# Patient Record
Sex: FEMALE | Race: OTHER | NOT HISPANIC OR LATINO | Employment: FULL TIME | URBAN - METROPOLITAN AREA
[De-identification: names, ages, dates, MRNs, and addresses within clinical notes are randomized per-mention and may not be internally consistent; named-entity substitution may affect disease eponyms.]

---

## 2023-10-26 ENCOUNTER — OFFICE VISIT (OUTPATIENT)
Dept: PRIMARY CARE | Facility: CLINIC | Age: 38
End: 2023-10-26
Payer: COMMERCIAL

## 2023-10-26 VITALS
HEIGHT: 66 IN | HEART RATE: 77 BPM | TEMPERATURE: 98 F | BODY MASS INDEX: 19.44 KG/M2 | DIASTOLIC BLOOD PRESSURE: 68 MMHG | SYSTOLIC BLOOD PRESSURE: 122 MMHG | WEIGHT: 121 LBS

## 2023-10-26 DIAGNOSIS — R79.89 ABNORMAL LFTS: ICD-10-CM

## 2023-10-26 DIAGNOSIS — J01.00 ACUTE NON-RECURRENT MAXILLARY SINUSITIS: Primary | ICD-10-CM

## 2023-10-26 DIAGNOSIS — R44.8 FACIAL PRESSURE: ICD-10-CM

## 2023-10-26 PROCEDURE — 99213 OFFICE O/P EST LOW 20 MIN: CPT | Performed by: INTERNAL MEDICINE

## 2023-10-26 PROCEDURE — 1036F TOBACCO NON-USER: CPT | Performed by: INTERNAL MEDICINE

## 2023-10-26 RX ORDER — AMOXICILLIN AND CLAVULANATE POTASSIUM 875; 125 MG/1; MG/1
875 TABLET, FILM COATED ORAL 2 TIMES DAILY
Qty: 20 TABLET | Refills: 0 | Status: SHIPPED | OUTPATIENT
Start: 2023-10-26 | End: 2023-11-05

## 2023-10-26 ASSESSMENT — ENCOUNTER SYMPTOMS
LOSS OF SENSATION IN FEET: 0
OCCASIONAL FEELINGS OF UNSTEADINESS: 0
DEPRESSION: 0

## 2023-10-26 ASSESSMENT — LIFESTYLE VARIABLES
HOW MANY STANDARD DRINKS CONTAINING ALCOHOL DO YOU HAVE ON A TYPICAL DAY: PATIENT DOES NOT DRINK
HOW OFTEN DO YOU HAVE SIX OR MORE DRINKS ON ONE OCCASION: NEVER
HOW OFTEN DO YOU HAVE A DRINK CONTAINING ALCOHOL: NEVER
SKIP TO QUESTIONS 9-10: 1
AUDIT-C TOTAL SCORE: 0

## 2023-10-26 ASSESSMENT — PATIENT HEALTH QUESTIONNAIRE - PHQ9
SUM OF ALL RESPONSES TO PHQ9 QUESTIONS 1 & 2: 0
2. FEELING DOWN, DEPRESSED OR HOPELESS: NOT AT ALL
1. LITTLE INTEREST OR PLEASURE IN DOING THINGS: NOT AT ALL

## 2023-10-26 NOTE — PROGRESS NOTES
Patient Is seen in the office today with chief complaint of cough congestion nasal drainage and facial pressure for more than 10 days.  She has no fever or chills.  Denies any shortness of breath or wheezing.  Has no headache or visual disturbances.  Denies any nausea vomiting abdominal pain.  She has no weakness of extremities.  Review of other systems are negative.    On examination:  General examination: There is no acute discomfort  Eyes: There is no pallor jaundice  Ears: Both external auditory canal and tympanic membrane's are normal  Nose: Nasal mucosa is congested  Oral cavity throat: Pharyngeal wall is congested: Postnasal discharge is present  Neck: There is no lymphadenopathy or thyroid enlargement  Lungs: Clear to auscultation  CVS: Heart sounds are regular there is no gallop murmur  Abdomen: Normal exam    Assessment and plan:  1. Acute sinusitis: I do recommend checking for influenza and COVID-19 infection however patient is requesting an antibiotic prescription because symptoms are lingering for more than 10 days.  Augmentin is being prescribed  2.  Cough: Advised to take cough medications over-the-counter if needed  3.  History of abnormal liver function test: Patient was advised to have some blood test done after the last office visit however patient has not done.  She will make an appointment for annual wellness and discuss further about this issue in the office visit.

## 2024-05-22 PROBLEM — M54.9 BACK PAIN: Status: ACTIVE | Noted: 2024-05-22

## 2024-05-22 PROBLEM — D24.9 BREAST FIBROADENOMA: Status: ACTIVE | Noted: 2024-05-22

## 2024-05-22 PROBLEM — N39.0 UTI (URINARY TRACT INFECTION): Status: ACTIVE | Noted: 2024-05-22

## 2024-05-22 PROBLEM — R79.89 ABNORMAL LFTS (LIVER FUNCTION TESTS): Status: ACTIVE | Noted: 2024-05-22

## 2024-05-22 PROBLEM — N91.0 DELAYED MENSTRUATION: Status: ACTIVE | Noted: 2024-05-22

## 2024-05-22 PROBLEM — M79.10 MYALGIA: Status: ACTIVE | Noted: 2024-05-22

## 2024-05-22 PROBLEM — R22.2 ABDOMINAL WALL LUMP: Status: ACTIVE | Noted: 2024-05-22

## 2024-05-22 PROBLEM — R79.89 ELEVATED SERUM HCG: Status: ACTIVE | Noted: 2024-05-22

## 2024-05-22 PROBLEM — R53.83 FATIGUE: Status: ACTIVE | Noted: 2024-05-22

## 2024-05-23 ENCOUNTER — OFFICE VISIT (OUTPATIENT)
Dept: PRIMARY CARE | Facility: CLINIC | Age: 39
End: 2024-05-23
Payer: COMMERCIAL

## 2024-05-23 VITALS
HEART RATE: 91 BPM | WEIGHT: 123 LBS | SYSTOLIC BLOOD PRESSURE: 122 MMHG | OXYGEN SATURATION: 100 % | DIASTOLIC BLOOD PRESSURE: 78 MMHG | HEIGHT: 66 IN | BODY MASS INDEX: 19.77 KG/M2

## 2024-05-23 DIAGNOSIS — M25.511 CHRONIC RIGHT SHOULDER PAIN: Primary | ICD-10-CM

## 2024-05-23 DIAGNOSIS — G89.29 CHRONIC RIGHT SHOULDER PAIN: Primary | ICD-10-CM

## 2024-05-23 DIAGNOSIS — R79.89 ABNORMAL LFTS: ICD-10-CM

## 2024-05-23 PROCEDURE — 1036F TOBACCO NON-USER: CPT | Performed by: INTERNAL MEDICINE

## 2024-05-23 PROCEDURE — 99213 OFFICE O/P EST LOW 20 MIN: CPT | Performed by: INTERNAL MEDICINE

## 2024-05-23 RX ORDER — METHYLPREDNISOLONE 4 MG/1
TABLET ORAL
Qty: 21 TABLET | Refills: 0 | Status: SHIPPED | OUTPATIENT
Start: 2024-05-23 | End: 2024-05-30

## 2024-05-23 RX ORDER — MELOXICAM 15 MG/1
15 TABLET ORAL DAILY
Qty: 30 TABLET | Refills: 0 | Status: SHIPPED | OUTPATIENT
Start: 2024-05-23 | End: 2025-05-23

## 2024-05-23 ASSESSMENT — ENCOUNTER SYMPTOMS
LOSS OF SENSATION IN FEET: 0
DEPRESSION: 0
OCCASIONAL FEELINGS OF UNSTEADINESS: 0

## 2024-05-23 NOTE — PROGRESS NOTES
Patient is seen in office today with chief complaint of right shoulder pain which is started about 5 months ago during a sporting activity.  The pain has been off-and-on persistent for 5 months.  Recently is severe and worse with the movements.  She has not noticed any swelling of the shoulder joint.  She did not have any fall or direct trauma to the area.  She has no other joint swelling or pain.  She has no respiratory symptoms.  Gets occasional congestion of the nasal mucosa.  Has no shortness of breath or wheezing.  Denies any chest pain or palpitation.  Has no nausea matting abdominal pain.  Review of other systems are negative.    On examination:  General examination: Normal  Eyes: There is no pallor or jaundice  Lungs: Clear to auscultation  CVS: Heart sounds are regular there is no gallop murmur  Musculoskeletal system movements of the right shoulder joint are slightly painful there is a tender area on the anterior aspect of the shoulder joint.    Assessment and plan  1.  Right shoulder pain: Possible tendinitis: Rotator cuff injury is also considered.  I will try a course of meloxicam and Medrol Dosepak.  Patient is advised in case of persistent symptoms she should see orthopedic surgeon for opinion.  Referral is given  2.  History of abnormal liver function test.  Patient wants to make an appointment in a few weeks for annual wellness she will discuss further workup at that time.

## 2025-03-26 ENCOUNTER — APPOINTMENT (OUTPATIENT)
Dept: PRIMARY CARE | Facility: CLINIC | Age: 40
End: 2025-03-26
Payer: COMMERCIAL

## 2025-03-26 VITALS
WEIGHT: 132 LBS | HEART RATE: 78 BPM | DIASTOLIC BLOOD PRESSURE: 69 MMHG | SYSTOLIC BLOOD PRESSURE: 117 MMHG | BODY MASS INDEX: 21.21 KG/M2 | OXYGEN SATURATION: 99 % | HEIGHT: 66 IN

## 2025-03-26 DIAGNOSIS — J30.9 CHRONIC ALLERGIC RHINITIS: ICD-10-CM

## 2025-03-26 DIAGNOSIS — Z00.00 ROUTINE GENERAL MEDICAL EXAMINATION AT A HEALTH CARE FACILITY: ICD-10-CM

## 2025-03-26 DIAGNOSIS — Z23 NEED FOR VACCINATION: ICD-10-CM

## 2025-03-26 DIAGNOSIS — N64.4 MASTALGIA IN FEMALE: Primary | ICD-10-CM

## 2025-03-26 DIAGNOSIS — D24.9 FIBROADENOMA OF BREAST, UNSPECIFIED LATERALITY: ICD-10-CM

## 2025-03-26 PROCEDURE — 90471 IMMUNIZATION ADMIN: CPT | Performed by: FAMILY MEDICINE

## 2025-03-26 PROCEDURE — 3008F BODY MASS INDEX DOCD: CPT | Performed by: FAMILY MEDICINE

## 2025-03-26 PROCEDURE — 99395 PREV VISIT EST AGE 18-39: CPT | Performed by: FAMILY MEDICINE

## 2025-03-26 PROCEDURE — 1036F TOBACCO NON-USER: CPT | Performed by: FAMILY MEDICINE

## 2025-03-26 PROCEDURE — 90715 TDAP VACCINE 7 YRS/> IM: CPT | Performed by: FAMILY MEDICINE

## 2025-03-26 RX ORDER — FLUTICASONE PROPIONATE 50 MCG
1 SPRAY, SUSPENSION (ML) NASAL DAILY
Qty: 16 G | Refills: 3 | Status: SHIPPED | OUTPATIENT
Start: 2025-03-26 | End: 2026-03-26

## 2025-03-26 RX ORDER — CETIRIZINE HYDROCHLORIDE 10 MG/1
10 TABLET ORAL DAILY
Qty: 30 TABLET | Refills: 2 | Status: SHIPPED | OUTPATIENT
Start: 2025-03-26 | End: 2025-06-24

## 2025-03-26 ASSESSMENT — PATIENT HEALTH QUESTIONNAIRE - PHQ9
SUM OF ALL RESPONSES TO PHQ9 QUESTIONS 1 AND 2: 0
2. FEELING DOWN, DEPRESSED OR HOPELESS: NOT AT ALL
1. LITTLE INTEREST OR PLEASURE IN DOING THINGS: NOT AT ALL
1. LITTLE INTEREST OR PLEASURE IN DOING THINGS: NOT AT ALL
SUM OF ALL RESPONSES TO PHQ9 QUESTIONS 1 AND 2: 0
2. FEELING DOWN, DEPRESSED OR HOPELESS: NOT AT ALL

## 2025-03-26 NOTE — PROGRESS NOTES
"Patient ID: Dena Joshi [id number] is a 39 y.o. female who presents for Establish Care (New patient to establish care).      Pt is here to establish care and get Routine Physical  Diet - pt is following home cooked meal  Exercise - none  Medication - none   Supplements - Omega-3 supplements  Denies smoking/ alcohol or drug use.   Immunization   Flu declined  , TDAp -given   Pap Smear - following [place]. 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 30 tablet 0     No current facility-administered medications for this visit.       Past Medical History:   Diagnosis Date    Visual impairment 2022     Social History     Tobacco Use    Smoking status: Never     Passive exposure: Never    Smokeless tobacco: Never   Vaping Use    Vaping status: Never Used   Substance Use Topics    Alcohol use: Never    Drug use: Never     Family History   Problem Relation Name Age of Onset    Hypertension Mother      Hypertension Father      Diabetes Other         /69   Pulse 78   Ht 1.676 m (5' 6\")   Wt 59.9 kg (132 lb)   SpO2 99%   BMI 21.31 kg/m²   Review of System  Constitutional:  Negative for appetite change, chills, fatigue, fever and unexpected weight change.   HENT:  Negative for hearing loss and voice change.    Eyes:  Negative for visual disturbance.   Respiratory:  Negative for cough, chest tightness, shortness of breath and wheezing.    Cardiovascular:  Negative for chest pain and leg swelling. " "  Gastrointestinal:  Negative for abdominal pain, blood in stool, constipation and diarrhea.   Genitourinary:  Negative for difficulty urinating and dysuria.   Musculoskeletal:  Negative for arthralgias and joint swelling.   Skin:  Negative for rash.   Neurological:  Negative for dizziness, weakness, numbness and headaches.   Psychiatric/Behavioral:  Negative for behavioral problems and confusion.      /69   Pulse 78   Ht 1.676 m (5' 6\")   Wt 59.9 kg (132 lb)   SpO2 99%   BMI 21.31 kg/m²     General Appearance:    Alert, cooperative, no distress, appears stated age   Head:    Normocephalic, without obvious abnormality, atraumatic   Eyes:    PERRL, conjunctiva/corneas clear, EOM's intact,    Neck:   Supple, symmetrical, No enlargement   Back:     Symmetric, no curvature, ROM normal, no CVA tenderness   Lungs:     Clear to auscultation bilaterally, respirations normal ,no wheezing or ronchi   Chest Wall:    No tenderness or deformity   Abdomen:     Soft, non-tender, bowel sounds active all four quadrants, no masses,   no organomegaly   Extremities:   Extremities normal, atraumatic, no cyanosis or edema   Pulses:   2+ and symmetric all extremities   Skin:   Skin color, texture, turgor normal, no rashes or lesions   Lymph nodes:   Cervical nodes normal   Neurologic:    normal strength, sensation and reflexes.      No visits with results within 3 Month(s) from this visit.   Latest known visit with results is:   Legacy Encounter on 02/24/2020   Component Date Value Ref Range Status    hCG Quantitative 02/24/2020 39,783 (A)  mIU/mL Final    Comment:  Low-level positive HCG results can be seen in early pregnancy, in    rose mary- or post-menopausal females due to normal pituitary HCG    production, or with analytic interference. Repeat testing in 48-72   hours can aid in assessing for pregnancy as results should double   in this time period. FSH measurement is recommended in rose mary- or   post-menopausal females as " concurrent elevation of FSH can support    pituitary production as the source of the HCG elevation.  .   Total HCG measurement is performed using the [employer] Access   Immunoassay which detects intact HCG and free beta HCG subunit.    This test is not indicated for use as a tumor marker.   HCG testing is performed using a different test methodology at [facility] than other [facility] hospitals. Direct result comparison   should only be made within the same method.          REF VALUES  NONPREGNANT FEMALE <5  MALES              <5         Assessment/Plan   Diagnoses and all orders for this visit:  Mastalgia in female  -     BI mammo bilateral diagnostic; Future  -     BI US breast complete bilateral; Future  Routine general medical examination at a health care facility  -     TSH with reflex to Free T4 if abnormal; Future  -     Lipid Panel; Future  -     Comprehensive Metabolic Panel; Future  -     CBC; Future  -     Urinalysis with Reflex Microscopic; Future  Fibroadenoma of breast, unspecified laterality  -     BI mammo bilateral diagnostic; Future  -     BI US breast complete bilateral; Future  Chronic allergic rhinitis  -     fluticasone (Flonase) 50 mcg/actuation nasal spray; Administer 1 spray into each nostril once daily. Shake gently. Before first use, prime pump. After use, clean tip and replace cap.  -     cetirizine (ZyrTEC) 10 mg tablet; Take 1 tablet (10 mg) by mouth once daily.  Need for vaccination  -     Tdap vaccine, age 7 years and older     Patient was advised to get complete blood work  Will get diagnostic mammogram and ultrasound  Chronic allergic rhinitis sinusitis  Patient was advised to take Flonase and Zyrtec as needed  Tdap given  Flu declined  Patient will consider next year  Patient following GYN Dr. Quach for Pap smear.  Advised to take vitamin D3 2000 international unit daily  Continue diet and exercise information provided. Healthy diet - exercise 30 min / day for 5  days a week.

## 2025-03-27 LAB
ALBUMIN SERPL-MCNC: 4.6 G/DL (ref 3.6–5.1)
ALP SERPL-CCNC: 74 U/L (ref 31–125)
ALT SERPL-CCNC: 52 U/L (ref 6–29)
ANION GAP SERPL CALCULATED.4IONS-SCNC: 7 MMOL/L (CALC) (ref 7–17)
APPEARANCE UR: CLEAR
AST SERPL-CCNC: 33 U/L (ref 10–30)
BACTERIA #/AREA URNS HPF: ABNORMAL /HPF
BILIRUB SERPL-MCNC: 0.4 MG/DL (ref 0.2–1.2)
BILIRUB UR QL STRIP: NEGATIVE
BUN SERPL-MCNC: 9 MG/DL (ref 7–25)
CALCIUM SERPL-MCNC: 9.5 MG/DL (ref 8.6–10.2)
CHLORIDE SERPL-SCNC: 103 MMOL/L (ref 98–110)
CHOLEST SERPL-MCNC: 200 MG/DL
CHOLEST/HDLC SERPL: 3.5 (CALC)
CO2 SERPL-SCNC: 28 MMOL/L (ref 20–32)
COLOR UR: YELLOW
CREAT SERPL-MCNC: 0.61 MG/DL (ref 0.5–0.97)
EGFRCR SERPLBLD CKD-EPI 2021: 118 ML/MIN/1.73M2
ERYTHROCYTE [DISTWIDTH] IN BLOOD BY AUTOMATED COUNT: 12.7 % (ref 11–15)
GLUCOSE SERPL-MCNC: 90 MG/DL (ref 65–139)
GLUCOSE UR QL STRIP: NEGATIVE
HCT VFR BLD AUTO: 42.1 % (ref 35–45)
HDLC SERPL-MCNC: 57 MG/DL
HGB BLD-MCNC: 14.1 G/DL (ref 11.7–15.5)
HGB UR QL STRIP: ABNORMAL
HYALINE CASTS #/AREA URNS LPF: ABNORMAL /LPF
KETONES UR QL STRIP: NEGATIVE
LDLC SERPL CALC-MCNC: 127 MG/DL (CALC)
LEUKOCYTE ESTERASE UR QL STRIP: ABNORMAL
MCH RBC QN AUTO: 30 PG (ref 27–33)
MCHC RBC AUTO-ENTMCNC: 33.5 G/DL (ref 32–36)
MCV RBC AUTO: 89.6 FL (ref 80–100)
NITRITE UR QL STRIP: NEGATIVE
NONHDLC SERPL-MCNC: 143 MG/DL (CALC)
PH UR STRIP: 7 [PH] (ref 5–8)
PLATELET # BLD AUTO: 284 THOUSAND/UL (ref 140–400)
PMV BLD REES-ECKER: 10.2 FL (ref 7.5–12.5)
POTASSIUM SERPL-SCNC: 4.5 MMOL/L (ref 3.5–5.3)
PROT SERPL-MCNC: 7.4 G/DL (ref 6.1–8.1)
PROT UR QL STRIP: NEGATIVE
RBC # BLD AUTO: 4.7 MILLION/UL (ref 3.8–5.1)
RBC #/AREA URNS HPF: ABNORMAL /HPF
SERVICE CMNT-IMP: ABNORMAL
SODIUM SERPL-SCNC: 138 MMOL/L (ref 135–146)
SP GR UR STRIP: 1 (ref 1–1.03)
SQUAMOUS #/AREA URNS HPF: ABNORMAL /HPF
TRIGL SERPL-MCNC: 70 MG/DL
TSH SERPL-ACNC: 0.95 MIU/L
WBC # BLD AUTO: 5.4 THOUSAND/UL (ref 3.8–10.8)
WBC #/AREA URNS HPF: ABNORMAL /HPF

## 2025-03-31 ENCOUNTER — TELEPHONE (OUTPATIENT)
Dept: PRIMARY CARE | Facility: CLINIC | Age: 40
End: 2025-03-31
Payer: COMMERCIAL

## 2025-03-31 NOTE — TELEPHONE ENCOUNTER
----- Message from Yecenia Gutiérrez sent at 3/28/2025  4:49 PM EDT -----  Abnormal transaminase.  Patient need acute hepatitis ABC panel  check and biliary ECHo.    Please place an order  Please inform pt.

## 2025-04-09 DIAGNOSIS — J30.9 CHRONIC ALLERGIC RHINITIS: ICD-10-CM

## 2025-04-10 RX ORDER — FLUTICASONE PROPIONATE 50 MCG
1 SPRAY, SUSPENSION (ML) NASAL DAILY
Qty: 48 ML | Refills: 2 | Status: SHIPPED | OUTPATIENT
Start: 2025-04-10

## 2025-04-10 RX ORDER — CETIRIZINE HYDROCHLORIDE 10 MG/1
10 TABLET ORAL DAILY
Qty: 90 TABLET | Refills: 1 | Status: SHIPPED | OUTPATIENT
Start: 2025-04-10